# Patient Record
Sex: FEMALE
[De-identification: names, ages, dates, MRNs, and addresses within clinical notes are randomized per-mention and may not be internally consistent; named-entity substitution may affect disease eponyms.]

---

## 2020-10-29 PROBLEM — Z00.00 ENCOUNTER FOR PREVENTIVE HEALTH EXAMINATION: Status: ACTIVE | Noted: 2020-10-29

## 2020-11-02 ENCOUNTER — APPOINTMENT (OUTPATIENT)
Dept: HEART AND VASCULAR | Facility: CLINIC | Age: 50
End: 2020-11-02
Payer: MEDICAID

## 2020-11-02 VITALS — DIASTOLIC BLOOD PRESSURE: 80 MMHG | SYSTOLIC BLOOD PRESSURE: 120 MMHG | HEART RATE: 63 BPM

## 2020-11-02 PROCEDURE — 99204 OFFICE O/P NEW MOD 45 MIN: CPT

## 2020-11-02 PROCEDURE — 99072 ADDL SUPL MATRL&STAF TM PHE: CPT

## 2020-11-02 NOTE — HISTORY OF PRESENT ILLNESS
[FreeTextEntry1] : 50-year-old female with past medical history of hyperlipidemia here for first evaluation with this provider\par The patient reports episodes of shortness of breath, described as difficulty taking a deep breath a/w palpittaions lasting for a few seconds, mostly at night\par walks 1` hour without any problems\par Denies syncope, presyncope, no extremity edema, palpitations.\par \par PMH\par HLD\par h/o DVT (reports post partum, s/p ? thrombectomy and on a/c only for months in 2013, no more h/o DVT)\par \par PSH\par C section\par \par ALL\par NKDA\par \par MEDS\par rosuvastatin 10 mg daily \par \par FH\par mother with HTN\par \par SH\par no smoke, no etoh, no drugs \par \par EKG 9/18/2020\par SR, wnl \par \par Labs 9/19/2020\par -Creatinine 0.75\par AST/ALT within normal limits\par Cholesterol 230\par HDL 52\par Triglycerides 158\par \par \par

## 2020-11-02 NOTE — ASSESSMENT
[FreeTextEntry1] : 50-year-old female with past medical history of hyperlipidemia here for first evaluation with this provider\par \par SOB\par -Unclear etiology, in the setting of excellent exercise tolerance unlikely to be cardiac related\par -we'll obtain an echocardiogram to rule out any wall motion abnormality\par \par PALPITATIONS\par -atypical description for arrhythmia\par -will obtain echo to r/o any WMA as above\par -will consider Holter monitor if symptoms persist\par -CMP wnl\par -recc to limit the amount of caffeine\par \par HTN\par -Well-controlled off of any medications\par \par HLD\par -cont with statin as per PMD\par \par f/u in 2 weeks for echo results and symptom assessment

## 2020-11-02 NOTE — PHYSICAL EXAM
[General Appearance - Well Developed] : well developed [Normal Appearance] : normal appearance [Well Groomed] : well groomed [General Appearance - Well Nourished] : well nourished [No Deformities] : no deformities [General Appearance - In No Acute Distress] : no acute distress [Normal Oral Mucosa] : normal oral mucosa [No Oral Pallor] : no oral pallor [No Oral Cyanosis] : no oral cyanosis [Normal Jugular Venous A Waves Present] : normal jugular venous A waves present [Normal Jugular Venous V Waves Present] : normal jugular venous V waves present [No Jugular Venous Mccarthy A Waves] : no jugular venous mccarthy A waves [Heart Rate And Rhythm] : heart rate and rhythm were normal [Heart Sounds] : normal S1 and S2 [Murmurs] : no murmurs present [Respiration, Rhythm And Depth] : normal respiratory rhythm and effort [Exaggerated Use Of Accessory Muscles For Inspiration] : no accessory muscle use [Auscultation Breath Sounds / Voice Sounds] : lungs were clear to auscultation bilaterally [Abdomen Soft] : soft [Abdomen Tenderness] : non-tender [Abdomen Mass (___ Cm)] : no abdominal mass palpated [Abnormal Walk] : normal gait [Gait - Sufficient For Exercise Testing] : the gait was sufficient for exercise testing [Nail Clubbing] : no clubbing of the fingernails [Cyanosis, Localized] : no localized cyanosis [Petechial Hemorrhages (___cm)] : no petechial hemorrhages [] : no ischemic changes [Oriented To Time, Place, And Person] : oriented to person, place, and time [Affect] : the affect was normal [Mood] : the mood was normal [No Anxiety] : not feeling anxious

## 2021-06-11 ENCOUNTER — HOSPITAL ENCOUNTER (EMERGENCY)
Dept: HOSPITAL 74 - JER | Age: 51
LOS: 1 days | Discharge: HOME | End: 2021-06-12
Payer: COMMERCIAL

## 2021-06-11 VITALS — DIASTOLIC BLOOD PRESSURE: 87 MMHG | TEMPERATURE: 98.9 F | HEART RATE: 60 BPM | SYSTOLIC BLOOD PRESSURE: 135 MMHG

## 2021-06-11 VITALS — BODY MASS INDEX: 28.1 KG/M2

## 2021-06-11 DIAGNOSIS — R10.10: Primary | ICD-10-CM

## 2021-06-11 LAB
ALBUMIN SERPL-MCNC: 4.4 G/DL (ref 3.4–5)
ALP SERPL-CCNC: 132 U/L (ref 45–117)
ALT SERPL-CCNC: 45 U/L (ref 13–61)
ANION GAP SERPL CALC-SCNC: 7 MMOL/L (ref 8–16)
AST SERPL-CCNC: 24 U/L (ref 15–37)
BASOPHILS # BLD: 0.9 % (ref 0–2)
BILIRUB SERPL-MCNC: 0.5 MG/DL (ref 0.2–1)
BUN SERPL-MCNC: 9.8 MG/DL (ref 7–18)
CALCIUM SERPL-MCNC: 9.5 MG/DL (ref 8.5–10.1)
CHLORIDE SERPL-SCNC: 106 MMOL/L (ref 98–107)
CO2 SERPL-SCNC: 28 MMOL/L (ref 21–32)
CREAT SERPL-MCNC: 0.9 MG/DL (ref 0.55–1.3)
DEPRECATED RDW RBC AUTO: 12.8 % (ref 11.6–15.6)
EOSINOPHIL # BLD: 0.9 % (ref 0–4.5)
GLUCOSE SERPL-MCNC: 103 MG/DL (ref 74–106)
HCT VFR BLD CALC: 44.8 % (ref 32.4–45.2)
HGB BLD-MCNC: 15.4 GM/DL (ref 10.7–15.3)
LIPASE SERPL-CCNC: 179 U/L (ref 73–393)
LYMPHOCYTES # BLD: 31.8 % (ref 8–40)
MCH RBC QN AUTO: 31 PG (ref 25.7–33.7)
MCHC RBC AUTO-ENTMCNC: 34.5 G/DL (ref 32–36)
MCV RBC: 90 FL (ref 80–96)
MONOCYTES # BLD AUTO: 6.3 % (ref 3.8–10.2)
NEUTROPHILS # BLD: 60.1 % (ref 42.8–82.8)
PLATELET # BLD AUTO: 167 K/MM3 (ref 134–434)
PMV BLD: 9.2 FL (ref 7.5–11.1)
PROT SERPL-MCNC: 8.1 G/DL (ref 6.4–8.2)
RBC # BLD AUTO: 4.98 M/MM3 (ref 3.6–5.2)
SODIUM SERPL-SCNC: 140 MMOL/L (ref 136–145)
WBC # BLD AUTO: 9.5 K/MM3 (ref 4–10)

## 2021-06-11 PROCEDURE — 3E033NZ INTRODUCTION OF ANALGESICS, HYPNOTICS, SEDATIVES INTO PERIPHERAL VEIN, PERCUTANEOUS APPROACH: ICD-10-PCS

## 2021-06-12 LAB
APPEARANCE UR: CLEAR
BACTERIA # UR AUTO: 19 /UL (ref 0–1359)
BILIRUB UR STRIP.AUTO-MCNC: NEGATIVE MG/DL
CASTS URNS QL MICRO: 0 /UL (ref 0–3.1)
COLOR UR: YELLOW
EPITH CASTS URNS QL MICRO: 2 /UL (ref 0–25.1)
KETONES UR QL STRIP: NEGATIVE
LEUKOCYTE ESTERASE UR QL STRIP.AUTO: (no result)
NITRITE UR QL STRIP: NEGATIVE
PH UR: 6 [PH] (ref 5–8)
PROT UR QL STRIP: NEGATIVE
PROT UR QL STRIP: NEGATIVE
RBC # BLD AUTO: 6 /UL (ref 0–23.9)
SP GR UR: 1 (ref 1.01–1.03)
UROBILINOGEN UR STRIP-MCNC: 0.2 MG/DL (ref 0.2–1)
WBC # UR AUTO: 8 /UL (ref 0–25.8)